# Patient Record
Sex: FEMALE | Race: WHITE | NOT HISPANIC OR LATINO | Employment: OTHER | URBAN - METROPOLITAN AREA
[De-identification: names, ages, dates, MRNs, and addresses within clinical notes are randomized per-mention and may not be internally consistent; named-entity substitution may affect disease eponyms.]

---

## 2020-09-22 ENCOUNTER — ESTABLISHED COMPREHENSIVE EXAM (OUTPATIENT)
Dept: URBAN - METROPOLITAN AREA CLINIC 42 | Facility: CLINIC | Age: 75
End: 2020-09-22

## 2020-09-22 DIAGNOSIS — H52.223: ICD-10-CM

## 2020-09-22 DIAGNOSIS — H52.03: ICD-10-CM

## 2020-09-22 DIAGNOSIS — H35.371: ICD-10-CM

## 2020-09-22 DIAGNOSIS — H04.123: ICD-10-CM

## 2020-09-22 DIAGNOSIS — H52.4: ICD-10-CM

## 2020-09-22 DIAGNOSIS — H25.89: ICD-10-CM

## 2020-09-22 PROCEDURE — 92134 CPTRZ OPH DX IMG PST SGM RTA: CPT

## 2020-09-22 PROCEDURE — 92015 DETERMINE REFRACTIVE STATE: CPT

## 2020-09-22 PROCEDURE — 92014 COMPRE OPH EXAM EST PT 1/>: CPT

## 2020-09-22 ASSESSMENT — TONOMETRY
OS_IOP_MMHG: 20
OD_IOP_MMHG: 18

## 2020-09-22 ASSESSMENT — VISUAL ACUITY
OD_SC: 20/50
OD_SC: 20/200
OS_SC: 20/50
OS_SC: 20/100
OU_SC: 20/40
OU_SC: 20/100

## 2020-09-22 ASSESSMENT — KERATOMETRY
OS_K2POWER_DIOPTERS: 43
OS_K1POWER_DIOPTERS: 42.5
OS_AXISANGLE_DEGREES: 12
OD_AXISANGLE2_DEGREES: 63
OD_K1POWER_DIOPTERS: 42.25
OS_AXISANGLE2_DEGREES: 102
OD_K2POWER_DIOPTERS: 43.5
OD_AXISANGLE_DEGREES: 153

## 2021-05-27 NOTE — PATIENT DISCUSSION
CENTRAL SEROUS CHORIORETINOPATHY OS:  [address] RECOMMEND OBSERVATION ONLY AT THIS TIME. RETURN AS SCHEDULED.

## 2021-05-27 NOTE — PATIENT DISCUSSION
Central Serous Chorioretinopathy Counseling: CSC is a condition in which fluid accumulates under the retina, causing a serous (fluid-filled) detachment and vision loss. CSC most often occurs in young and middle-aged adults. Vision loss is usually temporary but sometimes can become chronic or recur. The causes of CSC are not fully understood. It is thought that any systemic exposure to a corticosteroid drug can bring about or worsen CSC. It is possible that the body produces natural corticosteroids in times of stress that may trigger CSC in an individual prone to this condition. Several therapies have been used to treat chronic CSC, including thermal laser treatments, oral medications, and eye injections. Patients who are taking corticosteroids of any kind should discontinue their use if possible, but only after checking with their prescribing physician to ensure it is safe to stop. Suddenly discontinuing high-dose steroid medications can cause medical problems.

## 2021-08-09 NOTE — PATIENT DISCUSSION
CENTRAL SEROUS CHORIORETINOPATHY OS:  [facility] IMAGING RESULTS REVIEWED TODAY. TX OPTIONS DISCUSSED. PATIENT ELECTED TO CONTINUE WITH OBSERVATION ONLY AT THIS TIME. RETURN AS SCHEDULED.

## 2021-09-27 ENCOUNTER — PREPPED CHART (OUTPATIENT)
Dept: URBAN - METROPOLITAN AREA CLINIC 42 | Facility: CLINIC | Age: 76
End: 2021-09-27

## 2021-09-27 ENCOUNTER — ESTABLISHED COMPREHENSIVE EXAM (OUTPATIENT)
Dept: URBAN - METROPOLITAN AREA CLINIC 42 | Facility: CLINIC | Age: 76
End: 2021-09-27

## 2021-09-27 DIAGNOSIS — H25.89: ICD-10-CM

## 2021-09-27 DIAGNOSIS — H52.4: ICD-10-CM

## 2021-09-27 DIAGNOSIS — H52.223: ICD-10-CM

## 2021-09-27 DIAGNOSIS — H35.371: ICD-10-CM

## 2021-09-27 DIAGNOSIS — H52.03: ICD-10-CM

## 2021-09-27 PROCEDURE — 92014 COMPRE OPH EXAM EST PT 1/>: CPT

## 2021-09-27 PROCEDURE — 92015 DETERMINE REFRACTIVE STATE: CPT

## 2021-09-27 ASSESSMENT — TONOMETRY
OD_IOP_MMHG: 18
OS_IOP_MMHG: 16

## 2021-09-27 ASSESSMENT — KERATOMETRY
OS_K2POWER_DIOPTERS: 43
OD_K2POWER_DIOPTERS: 43.5
OD_K2POWER_DIOPTERS: 43.5
OS_K1POWER_DIOPTERS: 42.5
OD_AXISANGLE2_DEGREES: 63
OS_AXISANGLE_DEGREES: 12
OS_AXISANGLE2_DEGREES: 102
OD_K1POWER_DIOPTERS: 42.25
OD_AXISANGLE2_DEGREES: 63
OS_AXISANGLE_DEGREES: 12
OD_AXISANGLE_DEGREES: 153
OS_K1POWER_DIOPTERS: 42.5
OS_K2POWER_DIOPTERS: 43
OD_AXISANGLE_DEGREES: 153
OS_AXISANGLE2_DEGREES: 102
OD_K1POWER_DIOPTERS: 42.25

## 2022-08-26 ENCOUNTER — COMPREHENSIVE EXAM (OUTPATIENT)
Dept: URBAN - METROPOLITAN AREA CLINIC 42 | Facility: CLINIC | Age: 77
End: 2022-08-26

## 2022-08-26 DIAGNOSIS — H04.123: ICD-10-CM

## 2022-08-26 DIAGNOSIS — H52.223: ICD-10-CM

## 2022-08-26 DIAGNOSIS — H52.4: ICD-10-CM

## 2022-08-26 DIAGNOSIS — H35.371: ICD-10-CM

## 2022-08-26 DIAGNOSIS — H25.89: ICD-10-CM

## 2022-08-26 DIAGNOSIS — H35.362: ICD-10-CM

## 2022-08-26 DIAGNOSIS — H52.03: ICD-10-CM

## 2022-08-26 PROCEDURE — 92015 DETERMINE REFRACTIVE STATE: CPT

## 2022-08-26 PROCEDURE — 92014 COMPRE OPH EXAM EST PT 1/>: CPT

## 2022-08-26 ASSESSMENT — KERATOMETRY
OD_K2POWER_DIOPTERS: 42.25
OS_AXISANGLE2_DEGREES: 132
OD_K1POWER_DIOPTERS: 42.25
OS_K2POWER_DIOPTERS: 43
OS_K1POWER_DIOPTERS: 43.50
OD_K2POWER_DIOPTERS: 43.5
OD_AXISANGLE_DEGREES: 153
OD_K1POWER_DIOPTERS: 43.75
OD_AXISANGLE2_DEGREES: 153
OD_AXISANGLE_DEGREES: 63
OS_K1POWER_DIOPTERS: 42.5
OD_AXISANGLE2_DEGREES: 63
OS_AXISANGLE2_DEGREES: 102
OS_AXISANGLE_DEGREES: 12
OS_AXISANGLE_DEGREES: 42.75

## 2022-08-26 ASSESSMENT — TONOMETRY
OD_IOP_MMHG: 18
OS_IOP_MMHG: 20

## 2022-09-14 ENCOUNTER — CONSULTATION/EVALUATION (OUTPATIENT)
Dept: URBAN - METROPOLITAN AREA CLINIC 43 | Facility: CLINIC | Age: 77
End: 2022-09-14

## 2022-09-14 DIAGNOSIS — H35.362: ICD-10-CM

## 2022-09-14 DIAGNOSIS — H25.813: ICD-10-CM

## 2022-09-14 DIAGNOSIS — H35.371: ICD-10-CM

## 2022-09-14 DIAGNOSIS — H04.123: ICD-10-CM

## 2022-09-14 PROCEDURE — 92286 ANT SGM IMG I&R SPECLR MIC: CPT

## 2022-09-14 PROCEDURE — 92134 CPTRZ OPH DX IMG PST SGM RTA: CPT

## 2022-09-14 PROCEDURE — 99214 OFFICE O/P EST MOD 30 MIN: CPT

## 2022-09-14 PROCEDURE — 92250 FUNDUS PHOTOGRAPHY W/I&R: CPT

## 2022-09-14 PROCEDURE — 92025-3 CORNEAL TOPO, REFUSED

## 2022-09-14 PROCEDURE — 92136TC INTERFEROMETRY - TECHNICAL COMPONENT

## 2022-09-14 PROCEDURE — V2799PMN IMPRIMIS PRED-MOXI-NEPAF 5ML

## 2022-09-14 ASSESSMENT — VISUAL ACUITY
OD_CC: J10
OS_SC: 20/80
OD_SC: 20/70-1
OS_SC: J10
OD_CC: 20/40-1
OS_BAT: 20/100
OD_BAT: 20/100
OS_CC: J2
OD_SC: <J10
OS_CC: 20/30-1

## 2022-09-14 ASSESSMENT — TONOMETRY
OS_IOP_MMHG: 21
OD_IOP_MMHG: 23

## 2022-10-06 ENCOUNTER — SURGERY/PROCEDURE (OUTPATIENT)
Dept: URBAN - METROPOLITAN AREA CLINIC 43 | Facility: CLINIC | Age: 77
End: 2022-10-06

## 2022-10-06 DIAGNOSIS — H25.813: ICD-10-CM

## 2022-10-06 PROCEDURE — 66984 XCAPSL CTRC RMVL W/O ECP: CPT

## 2022-10-07 ENCOUNTER — POST-OP (OUTPATIENT)
Dept: URBAN - METROPOLITAN AREA CLINIC 42 | Facility: CLINIC | Age: 77
End: 2022-10-07

## 2022-10-07 DIAGNOSIS — Z96.1: ICD-10-CM

## 2022-10-07 PROCEDURE — 99024 POSTOP FOLLOW-UP VISIT: CPT

## 2022-10-07 ASSESSMENT — VISUAL ACUITY
OD_SC: 20/50
OS_SC: 20/80

## 2022-10-07 ASSESSMENT — TONOMETRY
OS_IOP_MMHG: 19
OD_IOP_MMHG: 21

## 2022-10-10 ENCOUNTER — POST OP/EVAL OF SECOND EYE (OUTPATIENT)
Dept: URBAN - METROPOLITAN AREA CLINIC 42 | Facility: CLINIC | Age: 77
End: 2022-10-10

## 2022-10-10 DIAGNOSIS — H25.812: ICD-10-CM

## 2022-10-10 PROCEDURE — 99211 OFF/OP EST MAY X REQ PHY/QHP: CPT

## 2022-10-10 ASSESSMENT — KERATOMETRY
OS_AXISANGLE_DEGREES: 114
OS_AXISANGLE2_DEGREES: 24
OD_AXISANGLE2_DEGREES: 159
OD_K2POWER_DIOPTERS: 41.75
OS_K2POWER_DIOPTERS: 42.25
OD_K1POWER_DIOPTERS: 42.75
OD_AXISANGLE_DEGREES: 69
OS_K1POWER_DIOPTERS: 42.75

## 2022-10-10 ASSESSMENT — VISUAL ACUITY
OS_BAT: 20/100
OS_SC: 20/100
OU_SC: 20/30-1
OD_SC: 20/30-1

## 2022-10-10 ASSESSMENT — TONOMETRY
OS_IOP_MMHG: 19
OD_IOP_MMHG: 16

## 2022-10-12 ENCOUNTER — SURGERY/PROCEDURE (OUTPATIENT)
Dept: URBAN - METROPOLITAN AREA CLINIC 43 | Facility: CLINIC | Age: 77
End: 2022-10-12

## 2022-10-12 DIAGNOSIS — H25.812: ICD-10-CM

## 2022-10-12 PROCEDURE — 66984 XCAPSL CTRC RMVL W/O ECP: CPT

## 2022-10-12 ASSESSMENT — KERATOMETRY
OS_AXISANGLE2_DEGREES: 24
OS_K2POWER_DIOPTERS: 42.25
OD_AXISANGLE2_DEGREES: 159
OD_AXISANGLE_DEGREES: 69
OS_AXISANGLE_DEGREES: 114
OD_K2POWER_DIOPTERS: 41.75
OS_K1POWER_DIOPTERS: 42.75
OD_K1POWER_DIOPTERS: 42.75

## 2022-10-13 ENCOUNTER — POST-OP (OUTPATIENT)
Dept: URBAN - METROPOLITAN AREA CLINIC 42 | Facility: CLINIC | Age: 77
End: 2022-10-13

## 2022-10-13 DIAGNOSIS — Z96.1: ICD-10-CM

## 2022-10-13 PROCEDURE — 99024 POSTOP FOLLOW-UP VISIT: CPT

## 2022-10-13 ASSESSMENT — KERATOMETRY
OD_K2POWER_DIOPTERS: 42.25
OS_K1POWER_DIOPTERS: 42.75
OS_AXISANGLE2_DEGREES: 8
OS_K2POWER_DIOPTERS: 42.25
OD_K1POWER_DIOPTERS: 42.75
OD_AXISANGLE_DEGREES: 64
OS_K1POWER_DIOPTERS: 43.00
OS_K2POWER_DIOPTERS: 42.50
OD_AXISANGLE2_DEGREES: 159
OD_AXISANGLE2_DEGREES: 154
OS_AXISANGLE_DEGREES: 114
OS_AXISANGLE_DEGREES: 98
OD_K1POWER_DIOPTERS: 43.50
OD_AXISANGLE_DEGREES: 69
OD_K2POWER_DIOPTERS: 41.75
OS_AXISANGLE2_DEGREES: 24

## 2022-10-13 ASSESSMENT — TONOMETRY
OS_IOP_MMHG: 24
OD_IOP_MMHG: 19

## 2022-10-13 ASSESSMENT — VISUAL ACUITY
OS_SC: 20/20
OU_SC: 20/20
OD_SC: 20/40

## 2022-10-20 ENCOUNTER — POST-OP (OUTPATIENT)
Dept: URBAN - METROPOLITAN AREA CLINIC 42 | Facility: CLINIC | Age: 77
End: 2022-10-20

## 2022-10-20 DIAGNOSIS — Z96.1: ICD-10-CM

## 2022-10-20 PROCEDURE — 99024 POSTOP FOLLOW-UP VISIT: CPT

## 2022-10-20 ASSESSMENT — KERATOMETRY
OD_AXISANGLE2_DEGREES: 159
OD_K1POWER_DIOPTERS: 43.00
OS_AXISANGLE_DEGREES: 103
OD_K2POWER_DIOPTERS: 41.75
OS_AXISANGLE2_DEGREES: 24
OD_AXISANGLE2_DEGREES: 154
OD_K1POWER_DIOPTERS: 42.75
OS_AXISANGLE2_DEGREES: 13
OS_K2POWER_DIOPTERS: 42.50
OS_AXISANGLE_DEGREES: 114
OD_AXISANGLE_DEGREES: 69
OD_AXISANGLE_DEGREES: 64
OD_K2POWER_DIOPTERS: 42.25
OD_AXISANGLE2_DEGREES: 151
OS_K1POWER_DIOPTERS: 42.75
OS_AXISANGLE_DEGREES: 98
OD_AXISANGLE_DEGREES: 61
OS_K2POWER_DIOPTERS: 42.25
OS_K1POWER_DIOPTERS: 43.00
OD_K1POWER_DIOPTERS: 43.50
OS_AXISANGLE2_DEGREES: 8

## 2022-10-20 ASSESSMENT — VISUAL ACUITY
OU_SC: 20/20-1
OS_SC: 20/20-1
OD_SC: 20/30

## 2022-10-20 ASSESSMENT — TONOMETRY
OS_IOP_MMHG: 18
OD_IOP_MMHG: 16

## 2022-11-09 ENCOUNTER — POST-OP (OUTPATIENT)
Dept: URBAN - METROPOLITAN AREA CLINIC 42 | Facility: CLINIC | Age: 77
End: 2022-11-09

## 2022-11-09 DIAGNOSIS — Z96.1: ICD-10-CM

## 2022-11-09 PROCEDURE — 99024 POSTOP FOLLOW-UP VISIT: CPT

## 2022-11-09 ASSESSMENT — KERATOMETRY
OS_K2POWER_DIOPTERS: 42.25
OS_AXISANGLE_DEGREES: 98
OD_AXISANGLE2_DEGREES: 151
OS_AXISANGLE2_DEGREES: 13
OS_AXISANGLE_DEGREES: 103
OD_AXISANGLE2_DEGREES: 154
OD_K1POWER_DIOPTERS: 42.75
OD_K1POWER_DIOPTERS: 43.00
OD_K2POWER_DIOPTERS: 41.75
OS_K1POWER_DIOPTERS: 42.75
OS_AXISANGLE2_DEGREES: 8
OD_AXISANGLE_DEGREES: 61
OD_AXISANGLE_DEGREES: 64
OD_AXISANGLE_DEGREES: 69
OS_K1POWER_DIOPTERS: 43.25
OD_AXISANGLE2_DEGREES: 159
OD_K2POWER_DIOPTERS: 42.25
OS_AXISANGLE2_DEGREES: 24
OD_K1POWER_DIOPTERS: 43.50
OS_K2POWER_DIOPTERS: 42.50
OS_K1POWER_DIOPTERS: 43.00
OS_AXISANGLE_DEGREES: 114

## 2022-11-09 ASSESSMENT — TONOMETRY
OS_IOP_MMHG: 18
OD_IOP_MMHG: 16

## 2022-11-09 ASSESSMENT — VISUAL ACUITY
OD_SC: 20/40
OU_SC: 20/40-1
OS_SC: 20/40-1

## 2023-08-29 ENCOUNTER — COMPREHENSIVE EXAM (OUTPATIENT)
Dept: URBAN - METROPOLITAN AREA CLINIC 42 | Facility: CLINIC | Age: 78
End: 2023-08-29

## 2023-08-29 DIAGNOSIS — H35.362: ICD-10-CM

## 2023-08-29 DIAGNOSIS — Z96.1: ICD-10-CM

## 2023-08-29 DIAGNOSIS — H04.123: ICD-10-CM

## 2023-08-29 DIAGNOSIS — H35.371: ICD-10-CM

## 2023-08-29 PROCEDURE — 92015 DETERMINE REFRACTIVE STATE: CPT

## 2023-08-29 PROCEDURE — 92014 COMPRE OPH EXAM EST PT 1/>: CPT

## 2023-08-29 ASSESSMENT — KERATOMETRY
OD_K2POWER_DIOPTERS: 41.75
OD_AXISANGLE2_DEGREES: 151
OS_K1POWER_DIOPTERS: 42.75
OD_K1POWER_DIOPTERS: 43.25
OS_AXISANGLE2_DEGREES: 24
OD_K1POWER_DIOPTERS: 43.00
OS_AXISANGLE_DEGREES: 110
OD_AXISANGLE_DEGREES: 64
OS_K1POWER_DIOPTERS: 43.00
OD_K2POWER_DIOPTERS: 42.00
OD_AXISANGLE2_DEGREES: 156
OS_AXISANGLE_DEGREES: 98
OD_AXISANGLE2_DEGREES: 154
OD_K1POWER_DIOPTERS: 42.75
OS_AXISANGLE2_DEGREES: 13
OD_AXISANGLE_DEGREES: 66
OS_K2POWER_DIOPTERS: 42.50
OD_AXISANGLE2_DEGREES: 159
OD_AXISANGLE_DEGREES: 61
OD_AXISANGLE_DEGREES: 69
OS_AXISANGLE_DEGREES: 103
OS_AXISANGLE2_DEGREES: 8
OS_K2POWER_DIOPTERS: 42.25
OD_K1POWER_DIOPTERS: 43.50
OS_AXISANGLE2_DEGREES: 20
OS_K1POWER_DIOPTERS: 43.25
OD_K2POWER_DIOPTERS: 42.25
OS_AXISANGLE_DEGREES: 114

## 2023-08-29 ASSESSMENT — TONOMETRY
OD_IOP_MMHG: 15
OS_IOP_MMHG: 16

## 2023-10-23 ENCOUNTER — CONSULTATION/EVALUATION (OUTPATIENT)
Dept: URBAN - METROPOLITAN AREA CLINIC 39 | Facility: CLINIC | Age: 78
End: 2023-10-23

## 2023-10-23 ENCOUNTER — SURGERY/PROCEDURE (OUTPATIENT)
Dept: URBAN - METROPOLITAN AREA SURGERY 14 | Facility: SURGERY | Age: 78
End: 2023-10-23

## 2023-10-23 DIAGNOSIS — H35.362: ICD-10-CM

## 2023-10-23 DIAGNOSIS — H26.493: ICD-10-CM

## 2023-10-23 DIAGNOSIS — H26.491: ICD-10-CM

## 2023-10-23 DIAGNOSIS — H04.123: ICD-10-CM

## 2023-10-23 DIAGNOSIS — Z96.1: ICD-10-CM

## 2023-10-23 DIAGNOSIS — H35.371: ICD-10-CM

## 2023-10-23 PROCEDURE — 66821 AFTER CATARACT LASER SURGERY: CPT

## 2023-10-23 PROCEDURE — 99214 OFFICE O/P EST MOD 30 MIN: CPT | Mod: 57

## 2023-10-23 ASSESSMENT — TONOMETRY
OD_IOP_MMHG: 12
OS_IOP_MMHG: 14

## 2023-10-23 ASSESSMENT — VISUAL ACUITY
OD_CC: 20/40-1
OD_BAT: 20/80
OS_CC: 20/20
OS_BAT: 20/50

## 2023-10-30 ENCOUNTER — POST-OP (OUTPATIENT)
Dept: URBAN - METROPOLITAN AREA CLINIC 42 | Facility: CLINIC | Age: 78
End: 2023-10-30

## 2023-10-30 DIAGNOSIS — H26.40: ICD-10-CM

## 2023-10-30 DIAGNOSIS — Z96.1: ICD-10-CM

## 2023-10-30 PROCEDURE — 99024 POSTOP FOLLOW-UP VISIT: CPT

## 2023-10-30 ASSESSMENT — KERATOMETRY
OS_K1POWER_DIOPTERS: 43.25
OD_AXISANGLE_DEGREES: 61
OD_K2POWER_DIOPTERS: 42.00
OS_AXISANGLE_DEGREES: 124
OD_AXISANGLE2_DEGREES: 151
OS_K2POWER_DIOPTERS: 42.75
OS_AXISANGLE2_DEGREES: 34
OD_K1POWER_DIOPTERS: 43.25

## 2023-10-30 ASSESSMENT — TONOMETRY
OD_IOP_MMHG: 13
OS_IOP_MMHG: 15

## 2024-10-21 ENCOUNTER — COMPREHENSIVE EXAM (OUTPATIENT)
Dept: URBAN - METROPOLITAN AREA CLINIC 42 | Facility: CLINIC | Age: 79
End: 2024-10-21

## 2024-10-21 DIAGNOSIS — H04.123: ICD-10-CM

## 2024-10-21 DIAGNOSIS — H26.40: ICD-10-CM

## 2024-10-21 DIAGNOSIS — H35.371: ICD-10-CM

## 2024-10-21 DIAGNOSIS — H25.89: ICD-10-CM

## 2024-10-21 DIAGNOSIS — H35.362: ICD-10-CM

## 2024-10-21 DIAGNOSIS — Z96.1: ICD-10-CM

## 2024-10-21 PROCEDURE — 92015 DETERMINE REFRACTIVE STATE: CPT

## 2024-10-21 PROCEDURE — 92014 COMPRE OPH EXAM EST PT 1/>: CPT

## 2024-11-18 ENCOUNTER — SURGERY/PROCEDURE (OUTPATIENT)
Facility: LOCATION | Age: 79
End: 2024-11-18

## 2024-11-18 ENCOUNTER — CONSULTATION/EVALUATION (OUTPATIENT)
Dept: URBAN - METROPOLITAN AREA CLINIC 39 | Facility: CLINIC | Age: 79
End: 2024-11-18

## 2024-11-18 DIAGNOSIS — H26.40: ICD-10-CM

## 2024-11-18 DIAGNOSIS — H26.492: ICD-10-CM

## 2024-11-18 PROCEDURE — 99214 OFFICE O/P EST MOD 30 MIN: CPT | Mod: 57

## 2024-11-18 PROCEDURE — 66821 AFTER CATARACT LASER SURGERY: CPT

## 2025-06-24 ENCOUNTER — COMPREHENSIVE EXAM (OUTPATIENT)
Age: 80
End: 2025-06-24

## 2025-06-24 DIAGNOSIS — H52.223: ICD-10-CM

## 2025-06-24 DIAGNOSIS — H00.15: ICD-10-CM

## 2025-06-24 DIAGNOSIS — Z96.1: ICD-10-CM

## 2025-06-24 DIAGNOSIS — H52.03: ICD-10-CM

## 2025-06-24 DIAGNOSIS — H52.4: ICD-10-CM

## 2025-06-24 DIAGNOSIS — H04.123: ICD-10-CM

## 2025-06-24 DIAGNOSIS — H35.362: ICD-10-CM

## 2025-06-24 DIAGNOSIS — H35.371: ICD-10-CM

## 2025-06-24 PROCEDURE — 92015 DETERMINE REFRACTIVE STATE: CPT

## 2025-06-24 PROCEDURE — 92014 COMPRE OPH EXAM EST PT 1/>: CPT
